# Patient Record
(demographics unavailable — no encounter records)

---

## 2025-07-02 NOTE — HISTORY OF PRESENT ILLNESS
[FreeTextEntry1] : Patient is experiencing bilateral breast pain Patient feels left breast palpable lump  Denies redness to the skin, nipple discharge, skin thickening  No breast imaging  No breast surgeries/biopsies  Mother had breast cancer  Maternal grandmother had breast cancer  Paternal grandmother had breast cancer  Mother is BRCA Testing

## 2025-07-02 NOTE — PROCEDURE
[FreeTextEntry3] : Bilateral breast ultrasound  The patient reports left breast mass  Targeted exam of the left breast demonstrates no focal findings  Four-quadrant survey the breast demonstrates no suspicious change  BI-RADS 2

## 2025-07-02 NOTE — ASSESSMENT
[FreeTextEntry1] : Left breast pain  No suspicious findings clinically or on ultrasound  Patient reassured  A total of 30 minutes was spent consultation